# Patient Record
(demographics unavailable — no encounter records)

---

## 2025-02-07 NOTE — DATA REVIEWED
[FreeTextEntry1] : X-ray Neck Soft Tissue 1/20/25: -severe enlargement of the adenoid obliterating the nasopharyngeal airway  Audio 1/16/25: -Type B Tymp AU -Hearing WNL to minimal/mild -8000 Hz with conductive components visualized throughout AU -100% discrimination at 65 dB AD and at 55 dB AS

## 2025-02-07 NOTE — PHYSICAL EXAM
[2+] : 2+ [Normal] : normal [de-identified] : fluid filed middle ear [de-identified] : fluid filed middle ear [de-identified] : deviated septum right. inflamed and swollen turbinates [de-identified] : inflamed and swollen turbinates

## 2025-02-07 NOTE — ASSESSMENT
[FreeTextEntry1] : Reviewed and reconciled medications, allergies, PMHx, PSHx, SocHx, FMHx.  Patient with h/o rhinitis, ETD, OME, and adenoid hypertrophy presents today with Mom and Dad stating that he has been having muffled hearing with nasal congestion for the past year. Mom states that he is constantly mouth breathing and snoring. Mom states that he gets a lot of mucus. Dad states that he has to hold his breathe while he eats because he cannot breathe through his nose. Mom denies that he takes Aspirin or blood thinners. Mom states that he has had a COVID vaccine. Dad states that he has used Flonase in the past with no improvement.  X-ray Neck Soft Tissue 1/20/25: -severe enlargement of the adenoid obliterating the nasopharyngeal airway  Audio 1/16/25: -Type B Tymp AU -Hearing WNL to minimal/mild -8000 Hz with conductive components visualized throughout AU -100% discrimination at 65 dB AD and at 55 dB AS  Physical exam: -fluid filed middle ear bilaterally -no lateralization to tuning forks -deviated septum right -inflamed and swollen turbinates bilaterally -class 2 non-obstructive tonsils  Plan: Audio - results interpreted by Dr. Kidd and reviewed with the patient's parents. X-ray reviewed and discussed with the patient's parents. -Discussed adenoidectomy. Possible complications including but not limited to infection, pain, bleeding, complications from anesthesia, and need for further surgery were discussed. All questions were answered. (BMI 99th percentile) -Discussed submucous resection of bilateral inferior turbinates. R/b/a discussed. Risk of turbinate surgery were discussed with the patient's Mom and Dad including but not limited to bleeding, infection, septal perforation, empty nose syndrome, numbness of the front two teeth, and loss of sense of smell.  All questions were answered. (BMI 99th percentile) -bilateral tympanostomy tubes R/b/a discussed. Risks include but are not limited to bleeding, infection, otorrhea, early extrusion of tubes, persistent tympanic membrane perforation, worsening of hearing, need for future surgery. All questions were answered. (BMI 99th percentile) -FU after surgery

## 2025-02-07 NOTE — ADDENDUM
[FreeTextEntry1] :  Documented by Almas Santos acting as scribe for Dr. Kidd on 02/07/2025. All Medical record entries made by the Scribe were at my, Dr. Kidd, direction and personally dictated by me on 02/07/2025 . I have reviewed the chart and agree that the record accurately reflects my personal performance of the history, physical exam, assessment and plan. I have also personally directed, reviewed, and agreed with the discharge instructions.

## 2025-02-07 NOTE — HISTORY OF PRESENT ILLNESS
[de-identified] : Patient with h/o rhinitis, ETD, OME, and adenoid hypertrophy presents today with Mom and Dad stating that he has been having muffled hearing with nasal congestion for the past year. Mom states that he is constantly mouth breathing and snoring. Mom states that he gets a lot of mucus. Dad states that he has to hold his breathe while he eats because he cannot breathe through his nose. Mom denies that he takes Aspirin or blood thinners. Mom states that he has had a COVID vaccine. Dad states that he has used Flonase in the past with no improvement.

## 2025-02-07 NOTE — CONSULT LETTER
[Dear  ___] : Dear  [unfilled], [Courtesy Letter:] : I had the pleasure of seeing your patient, [unfilled], in my office today. [Please see my note below.] : Please see my note below. [Consult Closing:] : Thank you very much for allowing me to participate in the care of this patient.  If you have any questions, please do not hesitate to contact me. [Sincerely,] : Sincerely, [FreeTextEntry3] :  Agus Kidd MD FACS

## 2025-02-24 NOTE — CONSULT LETTER
[Dear  ___] : Dear  [unfilled], [Courtesy Letter:] : I had the pleasure of seeing your patient, [unfilled], in my office today. [Please see my note below.] : Please see my note below. [Referral Closing:] : Thank you very much for seeing this patient.  If you have any questions, please do not hesitate to contact me. [Sincerely,] : Sincerely, [FreeTextEntry3] : Diego Garza PA-C

## 2025-02-24 NOTE — PHYSICAL EXAM
[1+] : 1+ [Normal] : normal [FreeTextEntry8] : cerumen removed via curettage [FreeTextEntry9] : cerumen removed via curettage [de-identified] : tube in place [de-identified] : tube in place

## 2025-02-24 NOTE — HISTORY OF PRESENT ILLNESS
[de-identified] : Patient presents s/p bilateral tympanostomy with tubes, adenoidectomy, bilateral smr  and bilateral outfracture of the inferior turbinate.  parents havent noticed any drainage from the ears. Used the ear drops for 3 days.

## 2025-02-24 NOTE — ASSESSMENT
[FreeTextEntry1] : Reviewed and reconciled medications, allergies, PMHx, PSHx, SocHx, FMHx.     physical exam: right ear: tube in place left ear: tube in place    audio: next visit   path: Specimen(s) Submitted 1  Adenoid tissue  Final Diagnosis  Adenoids, adenoidectomy - Adenoids with follicular lymphoid hyperplasia  Plan: Follow up in 1 month audio first with Dr. Kidd Keep ears dry, can use ear plugs or ear band it     Case discussed with Dr. Kidd